# Patient Record
(demographics unavailable — no encounter records)

---

## 2024-10-09 NOTE — HISTORY OF PRESENT ILLNESS
[de-identified] : 42-year-old female initially diagnosed with ITP between 12-14 years of age while growing up in [facility]. She states that a bone marrow evaluation at this time confirmed diagnosis of ITP. She was initially treated with prednisone over a one year time period with tapering off of prednisone by one year. She immigrated to the [place] at 17 years of age and was followed by several hematologists with baseline platelet count approximately 80,000. She required prednisone therapy for severe thrombocytopenia during pregnancies. The patient was referred for rheumatology consultation in July 2014 for BETTY positive 1:640, SS-A positive, antithyroid antibody positive, antiplatelet antibody positive. The patient was started on Plaquenil. Repeated efforts to taper off of prednisone have resulted in recurrent severe thrombocytopenia. The patient is currently on prednisone 10 mg daily.   [de-identified] : Since office visit on 3/14/16, the Prednisone dose has been adjusted to keep platelet count approximately 30,000. She has been doing well except for occasional ecchymoses on lower extremities. No epistaxis, gingival bleeding, she does have long history of heavy periods but no worse than usual. She complains of weight gain. No infections since last visit. She does complain of occasional pimples on her face, more oily face.  On 7/1/16, platelets- 40K, therefore Prednisone tapered to 15 mg po daily. She had second opinion consult with Dr. Dontae Mosqueda on 7/7/16, and he discussed 3 treatment options  including splenectomy, TPO agent, or dexamethasone/Rituxan(3 cycles of dexamethasone and 4 infusions of Rituxan) and this will be followed by 6 months of mycophenolate mofetil.   Since office visit on 7/26/16, the patient called office on 9/28/16 to describe acute onset at 4-5 am this morning of mild numbness in her right arm and leg which is still persistent and not resolving. 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 She denies decrease in motor strength, change in vision, slurred speech, dizziness/vertigo, extremity swelling or pain, shortness of breath, chest pain, fever, chills. She Denies active bleeding, hematoma, petechiae.  She thought the symptoms would just "go away" so she went home from work.   She recently was treated with Rituxan 375 mg/m2 IV X 4 with last dose on 9/14/16. She completed Decadron 40 mg po Days 1-4, 15-18, 29-32 with last dose on 9/24/16. Platelet count 122,000 on 9/23/16. This regimen was recommended by Dr. Dontae Mosqueda during second opinion consultation. Plan- She was instructed to go to ER for evaluation immediately. Given h/o ITP and acute symptoms she is instructed to take Prednisone 60 mg po X 1 immediately before going to ER. Her  is home and will take her to ER at [facility]. CT Brain without contrast- negative; Platelet- >200K. Patient instructed to schedule appointment with Neurologist.   During followup on 10/6/16, she feels well.  She reports a good energy level and appetite without any recent weight loss.  She denies any recent infections, fevers, chest pain, SOB, abdominal pain or dizziness. She reports occasional bruising on LE. She denies any epistaxis, gum bleeding, vaginal or rectal bleeding.  She denies any further episodes of RUE/RLE paresthesias.  She has a follow-up with neurology (Dr. López) scheduled for 11/9/2016. Last seen rheumatology May 2016.    2/2/2017: 34 year old woman here for follow-up for ITP.  Her last visit was 10/6/2016.  She denies any new medical history or changes in medications.  She has been off steroids since 9/24/2016.  She feels good.  She reports a stable energy level and appetite without any recent weight loss.  She denies any recent infections or fevers.  She c/o mild itchy rash on abdomen for a few weeks.  She c/o some easy bruising on LE (unchanged).  She denies any epistaxis, gum bleeding, blood in stool or any unexplained vaginal bleeding.  She last saw rheumatology 5/2016.    05/06/2019: Patient has been monitored with lab work to document stability of platelet count since last office visit on 2/2/17. She denies new medical disorders.   11/20/19:  Review of CBC on 12/2/2016 with platelet count= 113K; platelets ranged between 67K on 5/6/19 to 113K on 12/2/16. She had recent URTI which is resolving. She denies spontaneous bleeding or bruising; no menorrhagia.   08/12/20: The patient has good energy level and good appetite. She denies spontaneous bruising, active bleeding; no menorrhagia.   02/22/21: The patient has good energy level and good appetite. She denies spontaneous bruising, active bleeding; no menorrhagia. She had Rheum followup in late Sept 2020.  She had the COVID-19 Pfizer vaccine x 2 injections.   08/25/21: The patient has good energy level and good appetite. She has heavy menses past few months.  She denies spontaneous bruising, active bleeding. She had Rheum followup in late Sept 2020.   03/07/22: The patient has good energy level and good appetite. She states menses with normal flow over past 6 months. She denies spontaneous bruising, active bleeding. She had Rheum followup in late Sept 2020.   09/19/22: She continues to have  good energy level and good appetite. She states menses with normal flow over past 6 months. She denies spontaneous bruising, active bleeding; no fevers.  She will schedule followup appointment with Rheum.   04/17/23: She continues to have normal menstrual flow, not menorrhagia. She has good energy level and good appetite. She denies spontaneous bruising, active bleeding; no fevers. She did have mild sore throat and cough two weeks ago, lasting 4- 5 days. She feels well today.  She will schedule followup appointment with Rheum.   05/28/24: The patient states everything has been stable over the past year. She continues to follow with Rheum yearly and Plaquenil was discontinued after May 30, 2023 office visit. She denies recent infections.  She denies spontaneous bruising, active bleeding; she states her menses are normal.   07/30/24: Patient had repeat CBC at PCP office on 7/8/24 with decreasing platelet count to 33K. She had repeat CBC on 7/25 with continued decrease in platelets to 12K. I contacted her this evening to discuss lab results and recommend starting Prednisone 40 mg po daily tonight with plan to taper off when more definitive therapy can be started. I discussed two options of (1) Promacta versus retreatment with (2) Rituxan/Decadron which is same regimen she received in 2016(Rituxan 375 mg/m2 IV X 4 with last dose on 9/14/16. She completed Decadron 40 mg po Days 1-4, 15-18, 29-32 with last dose on 9/24/16). The concern for second regimen is immunosuppression in setting of increasing cases of COVID-19 infections(new variant). She informs me that she is going on her vacation in 2 weeks on a cruise and additional stops, therefore, for safety issues she will stay on Prednisone during her vacation then start new therapy after returning home. Patient started Prednisone 40 mg po daily on 7/25 and has been compliant with this medication. She had heavy menses x 3 days starting on 7/24/25. She takes Ferrous sulfate 325 mg po daily. Her bruising is resolving. She is going on vacation from 8/10- 8/21/24.   08/22/24: Patient returned from her cruise yesterday, she is well without any infections. She denies fever, chills, cough, shortness of breath. No bleeding.   10/9/24: The patient completed last day of Decadron on 9/30/24, and of note she had week 4 Rituxan on 9/19/24 with infusion reaction including hives after Hydrocortisone 50 mg IV.  She denies fever, chills, night sweats.

## 2024-10-09 NOTE — REASON FOR VISIT
[Follow-Up Visit] : a follow-up visit for [FreeTextEntry2] : immune thrombocytopenia, positive BETTY, SS-A antibody

## 2024-10-09 NOTE — PHYSICAL EXAM
[Normal] : normoactive bowel sounds, soft and nontender, no hepatosplenomegaly or masses appreciated [de-identified] : anicteric [de-identified] : RRR, normal S1S2 [de-identified] : no edema [de-identified] : no ecchymoses; no petechiae [de-identified] : A & Ox 4

## 2024-10-09 NOTE — ASSESSMENT
[FreeTextEntry1] : 42 year old woman with autoimmune disorder, positive BETTY, SS-A, anti-platelet antibodies.  1) ITP- Plan- Off steroids since 9/2016. Follow with close observation.  Patient now with worsening thrombocytopenia with platelet count 12,000 on 7/24/24. Started on Prednisone 40 mg po daily, now with rising platelets to 110,000.  Taper Prednisone to 30 mg po daily on 8/1/24 and continue until she returns from cruise on 8/21/24.   Patient has chosen to proceed with Rituxan 375 mg/m2 on days 1,8,15,22 along with Dexamethasone 28 mg/m2(40 mg max) on days 1-4, 15-18, and 29-32. This may be followed by 6 months of mycophenolate mofetil. This would be an attempt to be curative with medical therapy. I had discussion of potential side effects and toxicities of Rituxan, including PML, with patient. After all questions were addressed, she signed the Consent form. Discussion of Decadron side effects took place. She will initiate treatment on the week of 8/26/24 as this is best for her work schedule. Taper Prednisone to 20 mg po daily on 8/23. CBC today with platelet count- 95K. Check Hep B and C serologies; CMP 10/09/24: Patient completed last day of Decadron on 9/30/24, and of note she had week 4 Rituxan on 9/19/24. Monitor CBC with diff on 10/15 and 10/28/24.  Check Hep B serologies    2) Anemia- microcytic, in setting of heavy menses in the past. Previously took Ferrous sulfate 325 mg po daily if heavy menses. Normal menses in past 1 year.  On 9/15/23, Hgb in low normal range and MCV below normal range with decrease in transferrin sat and ferritin. Heavy menses on 7/24/24.  Plan- Continue Ferrous sulfate 325 mg po daily during her menses.  Increase dietary intake of foods high in iron content.  Check iron studies today           RTC in 3 months.

## 2024-10-09 NOTE — REVIEW OF SYSTEMS
[Easy Bruising] : a tendency for easy bruising [Fever] : no fever [Chills] : no chills [Night Sweats] : no night sweats [Nosebleeds] : no nosebleeds [Mucosal Pain] : no mucosal pain [Lower Ext Edema] : no lower extremity edema [Shortness Of Breath] : no shortness of breath [Cough] : no cough [Abdominal Pain] : no abdominal pain [Vomiting] : no vomiting [Joint Pain] : no joint pain [Muscle Pain] : no muscle pain [Skin Rash] : no skin rash [Dizziness] : no dizziness [Fainting] : no fainting [Easy Bleeding] : no tendency for easy bleeding [FreeTextEntry7] : Good appetite, no nausea [FreeTextEntry9] : no bone pain

## 2025-02-13 NOTE — PHYSICAL EXAM
[No Acute Distress] : no acute distress [Well Nourished] : well nourished [Well Developed] : well developed [Well-Appearing] : well-appearing [Normal Sclera/Conjunctiva] : normal sclera/conjunctiva [Normal Outer Ear/Nose] : the outer ears and nose were normal in appearance [No JVD] : no jugular venous distention [No Lymphadenopathy] : no lymphadenopathy [Supple] : supple [No Respiratory Distress] : no respiratory distress  [No Accessory Muscle Use] : no accessory muscle use [Clear to Auscultation] : lungs were clear to auscultation bilaterally [Normal Rate] : normal rate  [Regular Rhythm] : with a regular rhythm [Normal S1, S2] : normal S1 and S2 [No Murmur] : no murmur heard [No Edema] : there was no peripheral edema [No Extremity Clubbing/Cyanosis] : no extremity clubbing/cyanosis [Soft] : abdomen soft [Non Tender] : non-tender [Non-distended] : non-distended [Normal Posterior Cervical Nodes] : no posterior cervical lymphadenopathy [Normal Anterior Cervical Nodes] : no anterior cervical lymphadenopathy [No CVA Tenderness] : no CVA  tenderness [No Spinal Tenderness] : no spinal tenderness [No Joint Swelling] : no joint swelling [Grossly Normal Strength/Tone] : grossly normal strength/tone [No Rash] : no rash [Coordination Grossly Intact] : coordination grossly intact [No Focal Deficits] : no focal deficits [Normal Gait] : normal gait [Normal Affect] : the affect was normal [Normal Insight/Judgement] : insight and judgment were intact

## 2025-02-14 NOTE — HISTORY OF PRESENT ILLNESS
[de-identified] : 42-year-old female initially diagnosed with ITP between 12-14 years of age while growing up in [facility]. She states that a bone marrow evaluation at this time confirmed diagnosis of ITP. She was initially treated with prednisone over a one year time period with tapering off of prednisone by one year. She immig[place] to the [place] at 17 years of age and was followed by several hematologists with baseline platelet count approximately 80,000. She required prednisone therapy for severe thrombocytopenia during pregnancies. The patient was referred for rheumatology consultation in July 2014 for BETTY positive 1:640, SS-A positive, antithyroid antibody positive, antiplatelet antibody positive. The patient was started on Plaquenil. Repeated efforts to taper off of prednisone have resulted in recurrent severe thrombocytopenia. T    [de-identified] : Aleks with longstanding history of chronic elapsing  ITP, in 2016. Patient was following for some years. had relapse in August 2021 and was retreated with Rituxan and Dexamethasone 40mg for 4 weeks in August into September 2024.  Platts counts 883 in October 2024. The 2nd treatment in August was done for a platelet count of 12k/ul in July 2024.   Currently patient  feeling well, no complaints.    She does notice some mild allopecia after the most recent treatment, did not happen in 2016.    Patient with plats 127k/ul today. STill treanding upwards compared to O[place]obers 83k/ul.    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  Can place hola back on full work duty.

## 2025-02-14 NOTE — ASSESSMENT
[FreeTextEntry1] : This is a 42 year-old woman with a history of ITP since the age of 12.  Patient had  several relapses over the years, each interspaced by periods of remission that can last 3-5 year aat a time. Most recent relapse in 2024, priot to that 2016.    This past July Platelets 12k/ul. Patient was treated with Dexamethasone 40mg and rituxan 375mg/mm2 weekly. Patient plated count monica from 12 in July 2024 to 83 as of October 2024. Today plts 127k/ul .Platelets seem to still be on its way up.  Jose Miguel rodriguez observation Q 3 month. 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

## 2025-02-15 NOTE — ADDENDUM
[FreeTextEntry1] : Documented by Alexus Bhardwaj acting as a scribe for Dr. Roseann Carmichael. 02/13/2025   All medical record entries made by the scribe were at my, Dr. Roseann Carmichael, direction and personally dictated by me on 02/13/2025. I have reviewed the chart and agree that the record accurately reflects my personal performance of the history, physical exam, assessment and plan. I have also personally directed, reviewed, and agreed with the chart.

## 2025-02-15 NOTE — ASSESSMENT
[FreeTextEntry1] : Follow up  health maintenance last US breast (8/1/2024): dense breast, recommended six month follow up diagnostic left breast US referred for US breast, left  hair loss we'll check labs today  Low BP  increase daily water intake  SS-A antibody positive, BETTY positive follows with rheumatology not on any meds  Hx of anemia, ITP s/p Rituxan follows with hematology has f/u lenny't tomorrow cont iron tabs daily  bloodwork ordered.  follow up in one week for lab results.

## 2025-02-15 NOTE — HISTORY OF PRESENT ILLNESS
[de-identified] : Ms. STALIN PACHECO is a 42 year old female with Hx of BETTY positive, SS-A antibody positive, ITP, anemia, who presents for a follow up visit.   Pt c/o hair loss.  She is on leave from work and states she is ready to return to work. Denies any SOB, CP, abdominal pain, N/V/D, headache, dizziness, or leg swelling. Last iron infusion was received few months ago.

## 2025-02-15 NOTE — ASSESSMENT
[FreeTextEntry1] : Follow up  health maintenance last US breast (8/1/2024): dense breast, recommended six month follow up diagnostic left breast US referred for US breast, left  hair loss we'll check labs today  Low BP  increase daily water intake  SS-A antibody positive, BETTY positive follows with rheumatology not on any meds  Hx of anemia, ITP s/p Rituxan follows with hematology has f/u elnny't tomorrow cont iron tabs daily  bloodwork ordered.  follow up in one week for lab results.

## 2025-02-15 NOTE — HISTORY OF PRESENT ILLNESS
[de-identified] : Ms. STALIN PACHECO is a 42 year old female with Hx of BETTY positive, SS-A antibody positive, ITP, anemia, who presents for a follow up visit.   Pt c/o hair loss.  She is on leave from work and states she is ready to return to work. Denies any SOB, CP, abdominal pain, N/V/D, headache, dizziness, or leg swelling. Last iron infusion was received few months ago.

## 2025-05-12 NOTE — ASSESSMENT
[FreeTextEntry1] : This is a 43 year-old woman with a history of ITP since the age of 12.  Patient had  several relapses over the years, each interspaced by periods of remission that can last 3-5 year at a time. Most recent relapse in 2024, prior to that 2016.    This past July Platelets 12k/ul. Patient was treated with Dexamethasone 40mg and rituxan 375mg/mm2 weekly. Patient plated count monica from 12 in July 2024 to 83 as of October 2024.   Plates stable in February at 128k/ul, and today 8 months after initial severe acute bout of thrombocytopenia, platelets remain stable  yet a bit low at 126k/ul. Given stability of the thrombocytopenia , can decrease  interval for follow up to Q 6 months. Hg 12.0g/dl with lower ferritin levels in 2024, starting to rise now.  Recommend patient continue the ferrous sulfate supplements every other day which should be enough to maintain the normal hemoglobin. Patient states that menses are slightly heavier than expected for the first 2 days.

## 2025-05-12 NOTE — HISTORY OF PRESENT ILLNESS
[de-identified] : 42-year-old female initially diagnosed with ITP between 12-14 years of age while growing up in [facility]. She states that a bone marrow evaluation at this time confirmed diagnosis of ITP. She was initially treated with prednisone over a one year time period with tapering off of prednisone by one year. She immig[place] to the [place] at 17 years of age and was followed by several hematologists with baseline platelet count approximately 80,000. She required prednisone therapy for severe thrombocytopenia during pregnancies. The patient was referred for rheumatology consultation in July 2014 for BETTY positive 1:640, SS-A positive, antithyroid antibody positive, antiplatelet antibody positive. The patient was started on Plaquenil. Repeated efforts to taper off of prednisone have resulted in recurrent severe thrombocytopenia. T    [de-identified] : Patient with longstanding history of chronic elapsing  ITP, in 2016. Patient was following for some years. had relapse in August 2021 and was retreated with Rituxan and Dexamethasone 40mg for 4 weeks in August into September 2024.  Platts counts 83 in October 2024. Since hten patient has started follow [facility] at [facility]. in February plates 128k/ul now 126/k/ul fairly stable, no reports of unusual bleeding.